# Patient Record
Sex: FEMALE | Race: WHITE
[De-identification: names, ages, dates, MRNs, and addresses within clinical notes are randomized per-mention and may not be internally consistent; named-entity substitution may affect disease eponyms.]

---

## 2019-08-14 ENCOUNTER — HOSPITAL ENCOUNTER (EMERGENCY)
Dept: HOSPITAL 91 - FTE | Age: 19
Discharge: HOME | End: 2019-08-14
Payer: COMMERCIAL

## 2019-08-14 ENCOUNTER — HOSPITAL ENCOUNTER (EMERGENCY)
Dept: HOSPITAL 10 - FTE | Age: 19
Discharge: HOME | End: 2019-08-14
Payer: COMMERCIAL

## 2019-08-14 VITALS
WEIGHT: 133.82 LBS | BODY MASS INDEX: 24.63 KG/M2 | BODY MASS INDEX: 24.63 KG/M2 | WEIGHT: 133.82 LBS | HEIGHT: 62 IN | HEIGHT: 62 IN

## 2019-08-14 VITALS — DIASTOLIC BLOOD PRESSURE: 66 MMHG | RESPIRATION RATE: 16 BRPM | SYSTOLIC BLOOD PRESSURE: 120 MMHG | HEART RATE: 63 BPM

## 2019-08-14 DIAGNOSIS — L50.0: Primary | ICD-10-CM

## 2019-08-14 PROCEDURE — 99283 EMERGENCY DEPT VISIT LOW MDM: CPT

## 2019-08-14 NOTE — ERD
ER Documentation


Chief Complaint


Chief Complaint





intermittent hives w/ eye itching/swelling, worse after dairy





HPI


19-year-old female is here because she is been getting hives intermittently 


since Sunday.  He thinks is from eating dairy as it began after she ate on 


Sunday.  The rashes come and go.  She has not taken any medications for this.  


She has not had any lip or tongue swelling or difficulty breathing.  No fever or


recent illness.





ROS


All systems reviewed and are negative except as per history of present illness.





Medications


Home Meds


Active Scripts


Prednisone* (Prednisone*) 20 Mg Tab, 60 MG PO DAILY for 5 Days, TAB


   Prov:ERNA FRIEND PA-C         8/14/19





Allergies


Allergies:  


Coded Allergies:  


     No Known Allergy (Unverified , 8/14/19)





PMhx/Soc


History of Surgery:  Yes (arm surgery)


Hx Alcohol Use:  No


Hx Substance Use:  No


Hx Tobacco Use:  No


Smoking Status:  Never smoker





FmHx


Family History:  No diabetes





Physical Exam


Vitals





Vital Signs


  Date      Temp  Pulse  Resp  B/P (MAP)   Pulse Ox  O2          O2 Flow    FiO2


Time                                                 Delivery    Rate


   8/14/19  98.6     63    16      120/66       100


     15:15                           (84)





Physical Exam


Const:   No acute distress


Head:   Atraumatic 


Eyes:    Normal Conjunctiva


ENT:    Normal External Ears, Nose and Mouth.


Neck:               Full range of motion. No meningismus.


Resp:   Clear to auscultation bilaterally


Cardio:   Regular rate and rhythm, no murmurs


 


Skin:   Scant hives on upper extremities, no lip or tongue swelling





Procedures/MDM


Patient has allergic reaction.  No signs of anaphylaxis.  She can take Benadryl 


at home and given a prescription for short course of prednisone.  Patient 


counseled regarding my diagnostic impression and care plan. Prior to discharge 


all questions answered. Pt agrees with treatment plan and understands strict 


return precautions. Pt is instructed to follow up with primary care provider 


within 24-48 hours. Precautionary instructions provided including instructions 


to return to the ER if not improving or for any worsening or changing symptoms 


or concerns.





Departure


Diagnosis:  


   Primary Impression:  


   Allergic reaction


Condition:  Stable


Patient Instructions:  Hives





Additional Instructions:  


Call your primary care doctor TOMORROW for an appointment during the next 1-2 


days.See the doctor sooner or return here if your condition worsens before your 


appointment time.











ERNA FRIEND PA-C            Aug 14, 2019 15:34